# Patient Record
Sex: FEMALE
[De-identification: names, ages, dates, MRNs, and addresses within clinical notes are randomized per-mention and may not be internally consistent; named-entity substitution may affect disease eponyms.]

---

## 2024-09-12 ENCOUNTER — HOSPITAL ENCOUNTER (OUTPATIENT)
Facility: HOSPITAL | Age: 44
Setting detail: SPECIMEN
Discharge: HOME | End: 2024-09-15

## 2024-09-12 PROCEDURE — 83036 HEMOGLOBIN GLYCOSYLATED A1C: CPT

## 2024-09-12 PROCEDURE — 84443 ASSAY THYROID STIM HORMONE: CPT

## 2024-09-12 PROCEDURE — 80053 COMPREHEN METABOLIC PANEL: CPT

## 2024-09-13 LAB
ALBUMIN SERPL-MCNC: 4 G/DL (ref 3.5–5)
ALBUMIN/GLOB SERPL: 1.1 (ref 1.1–2.2)
ALP SERPL-CCNC: 71 U/L (ref 45–117)
ALT SERPL-CCNC: 60 U/L (ref 12–78)
ANION GAP SERPL CALC-SCNC: 7 MMOL/L (ref 2–12)
AST SERPL-CCNC: 36 U/L (ref 15–37)
BILIRUB SERPL-MCNC: 0.4 MG/DL (ref 0.2–1)
BUN SERPL-MCNC: 10 MG/DL (ref 6–20)
BUN/CREAT SERPL: 14 (ref 12–20)
CALCIUM SERPL-MCNC: 8.9 MG/DL (ref 8.5–10.1)
CHLORIDE SERPL-SCNC: 104 MMOL/L (ref 97–108)
CO2 SERPL-SCNC: 25 MMOL/L (ref 21–32)
CREAT SERPL-MCNC: 0.74 MG/DL (ref 0.55–1.02)
EST. AVERAGE GLUCOSE BLD GHB EST-MCNC: 148 MG/DL
GLOBULIN SER CALC-MCNC: 3.8 G/DL (ref 2–4)
GLUCOSE SERPL-MCNC: 162 MG/DL (ref 65–100)
HBA1C MFR BLD: 6.8 % (ref 4–5.6)
POTASSIUM SERPL-SCNC: 4.1 MMOL/L (ref 3.5–5.1)
PROT SERPL-MCNC: 7.8 G/DL (ref 6.4–8.2)
SODIUM SERPL-SCNC: 136 MMOL/L (ref 136–145)
TSH SERPL DL<=0.05 MIU/L-ACNC: 15.2 UIU/ML (ref 0.36–3.74)

## 2024-11-04 ENCOUNTER — HOSPITAL ENCOUNTER (OUTPATIENT)
Facility: HOSPITAL | Age: 44
Setting detail: SPECIMEN
Discharge: HOME | End: 2024-11-07

## 2024-11-04 LAB — TSH SERPL DL<=0.05 MIU/L-ACNC: 4.4 UIU/ML (ref 0.36–3.74)

## 2024-11-04 PROCEDURE — 84443 ASSAY THYROID STIM HORMONE: CPT

## 2025-01-13 ENCOUNTER — HOSPITAL ENCOUNTER (OUTPATIENT)
Facility: HOSPITAL | Age: 45
Setting detail: SPECIMEN
Discharge: HOME | End: 2025-01-16

## 2025-01-13 PROCEDURE — 84443 ASSAY THYROID STIM HORMONE: CPT

## 2025-01-13 PROCEDURE — 83036 HEMOGLOBIN GLYCOSYLATED A1C: CPT

## 2025-01-15 LAB
EST. AVERAGE GLUCOSE BLD GHB EST-MCNC: 163 MG/DL
HBA1C MFR BLD: 7.3 % (ref 4–5.6)
TSH SERPL DL<=0.05 MIU/L-ACNC: 8.34 UIU/ML (ref 0.36–3.74)

## 2025-01-20 ENCOUNTER — TELEPHONE (OUTPATIENT)
Age: 45
End: 2025-01-20

## 2025-01-20 NOTE — TELEPHONE ENCOUNTER
Called patient to remind of upcoming appointment with [facility] on 1/23/2025- no answer. Left message with appt. Details and E[facility] main office number  in case that she wants to cancel/reschedule.  Nan Broderick

## 2025-04-03 ENCOUNTER — HOSPITAL ENCOUNTER (OUTPATIENT)
Facility: HOSPITAL | Age: 45
Setting detail: SPECIMEN
Discharge: HOME | End: 2025-04-06

## 2025-04-03 LAB
CHOLEST SERPL-MCNC: 149 MG/DL
CREAT UR-MCNC: 23.7 MG/DL
EST. AVERAGE GLUCOSE BLD GHB EST-MCNC: 171 MG/DL
HBA1C MFR BLD: 7.6 % (ref 4–5.6)
HDLC SERPL-MCNC: 46 MG/DL
HDLC SERPL: 3.2 (ref 0–5)
LDLC SERPL CALC-MCNC: 64 MG/DL (ref 0–100)
MICROALBUMIN UR-MCNC: 0.65 MG/DL
MICROALBUMIN/CREAT UR-RTO: 27 MG/G (ref 0–30)
TRIGL SERPL-MCNC: 195 MG/DL
TSH SERPL DL<=0.05 MIU/L-ACNC: 3.34 UIU/ML (ref 0.36–3.74)
VLDLC SERPL CALC-MCNC: 39 MG/DL

## 2025-04-03 PROCEDURE — 82570 ASSAY OF URINE CREATININE: CPT

## 2025-04-03 PROCEDURE — 84443 ASSAY THYROID STIM HORMONE: CPT

## 2025-04-03 PROCEDURE — 82043 UR ALBUMIN QUANTITATIVE: CPT

## 2025-04-03 PROCEDURE — 80061 LIPID PANEL: CPT

## 2025-04-03 PROCEDURE — 83036 HEMOGLOBIN GLYCOSYLATED A1C: CPT

## 2025-06-10 ENCOUNTER — HOSPITAL ENCOUNTER (OUTPATIENT)
Facility: HOSPITAL | Age: 45
Discharge: HOME | End: 2025-06-13

## 2025-06-10 ENCOUNTER — OFFICE VISIT (OUTPATIENT)
Age: 45
End: 2025-06-10

## 2025-06-10 VITALS — BODY MASS INDEX: 32.1 KG/M2 | HEIGHT: 61 IN | WEIGHT: 170 LBS

## 2025-06-10 DIAGNOSIS — Z01.419 ENCOUNTER FOR WELL WOMAN EXAM: Primary | ICD-10-CM

## 2025-06-10 DIAGNOSIS — Z12.31 VISIT FOR SCREENING MAMMOGRAM: ICD-10-CM

## 2025-06-10 DIAGNOSIS — Z97.5 IMPLANON IN PLACE: ICD-10-CM

## 2025-06-10 PROCEDURE — 77067 SCR MAMMO BI INCL CAD: CPT

## 2025-06-10 NOTE — PROGRESS NOTES
Assessment/Plan:    Stacia was seen today for ewl.    Diagnoses and all orders for this visit:    Encounter for well woman exam    Implanon in place        No follow-up provider specified.    Rachelle Galvez PA-C  Stacia Rob Gill expressed understanding of this plan. An AVS was printed and given to the patient.      ----------------------------------------------------------------------    Chief Complaint   Patient presents with    EWL       History of Present Illness:  EWL annual well woman visit  No breast concerns or complaints  UTD on pap and declines pelvic exam  Having irregular periods with the implanon- has a period every 2-3 months  No risk for DV      No past medical history on file.    No current outpatient medications on file.     No current facility-administered medications for this visit.       No Known Allergies    Social History     Tobacco Use    Smoking status: Never    Smokeless tobacco: Never       No family history on file.    Physical Exam:     There were no vitals taken for this visit.    A&Ox3  WDWN NAD  Respirations normal and non labored  Breast exam paulina neg for mass, tenderness, skin color changes, dimpling or retractions

## 2025-06-10 NOTE — PROGRESS NOTES
[facility] HISTORY QUESTIONNAIRE     used  [] No    [x]   Yes    Ht--5'1\"    Wt--120    Do you have any breast concerns today?  ___no___    Are you experiencing any of the following?   No Yes Comments   Nipple Discharge [x]                                  []                                     Breast Lump/Masses [x]                                  []                                     Breast Skin Changes [x]                                  []                                           When and where was last mammogram performed?  ___[facility] 2024_     No Yes Comments   Have you ever had a mammogram that required additional follow up?  []     [x]     Last mammogram had to have US   Have you ever had a breast biopsy? [x]                                  []                                     Do you have breast implants?  [x]        []                When and where was your last Pap test performed? __2022 [facility]-__    Have you ever had an abnormal Pap test? ( Please note, if Hx of Colposcopy, LEEP, CKC, HARVEY 2 or 3)  No Yes Comments   [x]                                  []                                       Have you been through menopause?   No Yes Date of LMP   [x]                                  []                                  2/2025 pt has implant     For patients who are still menstruating: Do you use any form of family planning? implant    Have you had a hysterectomy?   No Yes Comments (why)   [x]                                  []                                        No Yes Comments   Vaginal Discharge [x]                                  []                                     Abnormal/unusual vaginal bleeding    (Post menopausal  [x]                                  []                                       Did your mother take SAMY?  No Yes Unknown   []                                  []                                  [x]               No Yes Comments   Have you

## 2025-06-12 ENCOUNTER — RESULTS FOLLOW-UP (OUTPATIENT)
Age: 45
End: 2025-06-12

## 2025-06-12 ENCOUNTER — TELEPHONE (OUTPATIENT)
Age: 45
End: 2025-06-12

## 2025-06-12 NOTE — CONSULTS
Session ID: [id number]  Session Duration: 12 minutes  Language: Maldivian   ID: #[id number]   Name: Barbara

## 2025-06-12 NOTE — TELEPHONE ENCOUNTER
I called patient today and gave results from screening mammogram and explained need for additional diagnostic imaging. I explained to patient that this would be covered by the [facility] program. I explained to patient that she will leave this appointment with results of imaging and plan decided about needed follow up. Patient given [facility] office phone number to call us with any additional questions. I have routed a secure message to central scheduling to please call patient and help her with scheduling this appointment. Patient will need this info mailed to her. I don't think she can read or write. CARY PRESLEY, RN

## 2025-07-11 ENCOUNTER — HOSPITAL ENCOUNTER (OUTPATIENT)
Facility: HOSPITAL | Age: 45
Discharge: HOME | End: 2025-07-14

## 2025-07-11 VITALS — HEIGHT: 61 IN | WEIGHT: 170 LBS | BODY MASS INDEX: 32.1 KG/M2

## 2025-07-11 DIAGNOSIS — R92.8 ABNORMAL MAMMOGRAM: ICD-10-CM

## 2025-07-11 PROCEDURE — G0279 TOMOSYNTHESIS, MAMMO: HCPCS

## 2025-07-31 ENCOUNTER — HOSPITAL ENCOUNTER (OUTPATIENT)
Facility: HOSPITAL | Age: 45
Setting detail: SPECIMEN
Discharge: HOME | End: 2025-08-03

## 2025-07-31 LAB
EST. AVERAGE GLUCOSE BLD GHB EST-MCNC: 198 MG/DL
HBA1C MFR BLD: 8.5 % (ref 4–5.6)

## 2025-07-31 PROCEDURE — 83036 HEMOGLOBIN GLYCOSYLATED A1C: CPT
